# Patient Record
Sex: FEMALE | Race: WHITE | NOT HISPANIC OR LATINO
[De-identification: names, ages, dates, MRNs, and addresses within clinical notes are randomized per-mention and may not be internally consistent; named-entity substitution may affect disease eponyms.]

---

## 2021-05-01 ENCOUNTER — TRANSCRIPTION ENCOUNTER (OUTPATIENT)
Age: 46
End: 2021-05-01

## 2021-05-01 ENCOUNTER — APPOINTMENT (OUTPATIENT)
Dept: DISASTER EMERGENCY | Facility: CLINIC | Age: 46
End: 2021-05-01

## 2021-05-01 DIAGNOSIS — Z01.818 ENCOUNTER FOR OTHER PREPROCEDURAL EXAMINATION: ICD-10-CM

## 2021-05-03 NOTE — ASU PATIENT PROFILE, ADULT - PSH
delivery delivered    Ectopic pregnancy, tubal    History of appendectomy    History of loop recorder      delivery delivered    Ectopic pregnancy, tubal    H/O prior ablation treatment  PVC ablation  History of appendectomy    History of loop recorder

## 2021-05-03 NOTE — ASU PATIENT PROFILE, ADULT - PMH
Lupus    Vitamin B6 deficiency     Gene mutation  MTHFR  Lupus    Miscarriage  x 7 secondary  to gene mutation  Vitamin B6 deficiency

## 2021-05-04 ENCOUNTER — RESULT REVIEW (OUTPATIENT)
Age: 46
End: 2021-05-04

## 2021-05-04 ENCOUNTER — OUTPATIENT (OUTPATIENT)
Dept: INPATIENT UNIT | Facility: HOSPITAL | Age: 46
LOS: 1 days | Discharge: HOME | End: 2021-05-04
Payer: COMMERCIAL

## 2021-05-04 VITALS
HEART RATE: 65 BPM | RESPIRATION RATE: 16 BRPM | HEIGHT: 63 IN | WEIGHT: 175.05 LBS | TEMPERATURE: 99 F | SYSTOLIC BLOOD PRESSURE: 128 MMHG | OXYGEN SATURATION: 98 % | DIASTOLIC BLOOD PRESSURE: 81 MMHG

## 2021-05-04 VITALS
HEART RATE: 68 BPM | SYSTOLIC BLOOD PRESSURE: 114 MMHG | DIASTOLIC BLOOD PRESSURE: 75 MMHG | RESPIRATION RATE: 16 BRPM | OXYGEN SATURATION: 94 %

## 2021-05-04 DIAGNOSIS — Z98.890 OTHER SPECIFIED POSTPROCEDURAL STATES: Chronic | ICD-10-CM

## 2021-05-04 DIAGNOSIS — Z88.0 ALLERGY STATUS TO PENICILLIN: ICD-10-CM

## 2021-05-04 DIAGNOSIS — M32.9 SYSTEMIC LUPUS ERYTHEMATOSUS, UNSPECIFIED: ICD-10-CM

## 2021-05-04 DIAGNOSIS — N60.12 DIFFUSE CYSTIC MASTOPATHY OF LEFT BREAST: ICD-10-CM

## 2021-05-04 DIAGNOSIS — Z87.891 PERSONAL HISTORY OF NICOTINE DEPENDENCE: ICD-10-CM

## 2021-05-04 DIAGNOSIS — N62 HYPERTROPHY OF BREAST: ICD-10-CM

## 2021-05-04 DIAGNOSIS — Z86.718 PERSONAL HISTORY OF OTHER VENOUS THROMBOSIS AND EMBOLISM: ICD-10-CM

## 2021-05-04 DIAGNOSIS — E72.12 METHYLENETETRAHYDROFOLATE REDUCTASE DEFICIENCY: ICD-10-CM

## 2021-05-04 DIAGNOSIS — N60.82 OTHER BENIGN MAMMARY DYSPLASIAS OF LEFT BREAST: ICD-10-CM

## 2021-05-04 DIAGNOSIS — Z88.5 ALLERGY STATUS TO NARCOTIC AGENT: ICD-10-CM

## 2021-05-04 DIAGNOSIS — I10 ESSENTIAL (PRIMARY) HYPERTENSION: ICD-10-CM

## 2021-05-04 DIAGNOSIS — Z90.49 ACQUIRED ABSENCE OF OTHER SPECIFIED PARTS OF DIGESTIVE TRACT: Chronic | ICD-10-CM

## 2021-05-04 DIAGNOSIS — M54.6 PAIN IN THORACIC SPINE: ICD-10-CM

## 2021-05-04 DIAGNOSIS — O00.109 UNSPECIFIED TUBAL PREGNANCY WITHOUT INTRAUTERINE PREGNANCY: Chronic | ICD-10-CM

## 2021-05-04 DIAGNOSIS — D64.9 ANEMIA, UNSPECIFIED: ICD-10-CM

## 2021-05-04 DIAGNOSIS — R01.1 CARDIAC MURMUR, UNSPECIFIED: ICD-10-CM

## 2021-05-04 DIAGNOSIS — N60.31 FIBROSCLEROSIS OF RIGHT BREAST: ICD-10-CM

## 2021-05-04 DIAGNOSIS — N60.32 FIBROSCLEROSIS OF LEFT BREAST: ICD-10-CM

## 2021-05-04 LAB — HCG UR QL: NEGATIVE — SIGNIFICANT CHANGE UP

## 2021-05-04 PROCEDURE — 88305 TISSUE EXAM BY PATHOLOGIST: CPT

## 2021-05-04 PROCEDURE — 88305 TISSUE EXAM BY PATHOLOGIST: CPT | Mod: 26

## 2021-05-04 PROCEDURE — 81025 URINE PREGNANCY TEST: CPT

## 2021-05-04 PROCEDURE — C9399: CPT

## 2021-05-04 RX ORDER — FENTANYL CITRATE 50 UG/ML
50 INJECTION INTRAVENOUS
Refills: 0 | Status: DISCONTINUED | OUTPATIENT
Start: 2021-05-04 | End: 2021-05-04

## 2021-05-04 RX ORDER — METOPROLOL TARTRATE 50 MG
1 TABLET ORAL
Qty: 0 | Refills: 0 | DISCHARGE

## 2021-05-04 RX ORDER — SODIUM CHLORIDE 9 MG/ML
1000 INJECTION, SOLUTION INTRAVENOUS
Refills: 0 | Status: DISCONTINUED | OUTPATIENT
Start: 2021-05-04 | End: 2021-05-04

## 2021-05-04 RX ORDER — ONDANSETRON 8 MG/1
4 TABLET, FILM COATED ORAL ONCE
Refills: 0 | Status: COMPLETED | OUTPATIENT
Start: 2021-05-04 | End: 2021-05-04

## 2021-05-04 RX ORDER — TRAMADOL HYDROCHLORIDE 50 MG/1
25 TABLET ORAL ONCE
Refills: 0 | Status: DISCONTINUED | OUTPATIENT
Start: 2021-05-04 | End: 2021-05-04

## 2021-05-04 RX ORDER — MELOXICAM 15 MG/1
1 TABLET ORAL
Qty: 0 | Refills: 0 | DISCHARGE

## 2021-05-04 RX ADMIN — FENTANYL CITRATE 50 MICROGRAM(S): 50 INJECTION INTRAVENOUS at 15:49

## 2021-05-04 RX ADMIN — FENTANYL CITRATE 50 MICROGRAM(S): 50 INJECTION INTRAVENOUS at 15:32

## 2021-05-04 RX ADMIN — FENTANYL CITRATE 50 MICROGRAM(S): 50 INJECTION INTRAVENOUS at 15:00

## 2021-05-04 RX ADMIN — ONDANSETRON 4 MILLIGRAM(S): 8 TABLET, FILM COATED ORAL at 15:04

## 2021-05-04 RX ADMIN — TRAMADOL HYDROCHLORIDE 25 MILLIGRAM(S): 50 TABLET ORAL at 17:06

## 2021-05-04 NOTE — H&P ADULT - PROBLEM SELECTOR PLAN 1
Surgery today  OOB to chair   pain management   advance diet as tolerated  DC to home when meetin asu criteria

## 2021-05-04 NOTE — H&P ADULT - NSHPPHYSICALEXAM_GEN_ALL_CORE
Neuro: A+O x4 AMEZCUA  RESP: CTA A/P  CV: RRR S1 S2  Breast: Hypertrophic ptotic  ABD: Soft non tender +BS

## 2021-05-04 NOTE — ASU DISCHARGE PLAN (ADULT/PEDIATRIC) - ASU DC SPECIAL INSTRUCTIONSFT
You should follow-up in the office on 5/7/21, please call for follow-up appointment if you have not already made one. [phone]. Please keep an accurate record of your drain output and bring it to your follow up appointment.  Please start Aspirin 81mg tonight for the next 7 days.   Prescription pain medications have been prescribed for you, take as needed for pain only. Do not drive a vehicle or operate machinery while taking narcotic pain medication.   Ambulating is very important post operatively, make sure you ambulate several times daily.   If you experience bleeding that does not stop, swelling, hardness of surgical site, chest pain, shortness of breath, leg pain, dizziness or any signs or symptoms of infection such as fever, redness, pus, foul smell of surgical site please contact the office immediately, [phone].

## 2021-05-04 NOTE — ASU DISCHARGE PLAN (ADULT/PEDIATRIC) - CARE PROVIDER_API CALL
Garland Antonio)  Plastic Surgery  [address], [address]  [place] [address]  Phone: [phone]  Fax: [phone]  Follow Up Time:

## 2021-05-04 NOTE — H&P ADULT - NSICDXPASTSURGICALHX_GEN_ALL_CORE_FT
PAST SURGICAL HISTORY:   delivery delivered     Ectopic pregnancy, tubal     H/O prior ablation treatment PVC ablation    History of appendectomy     History of loop recorder

## 2021-05-04 NOTE — H&P ADULT - NSICDXPASTMEDICALHX_GEN_ALL_CORE_FT
PAST MEDICAL HISTORY:  Gene mutation MTHFR    Lupus     Macromastia     Miscarriage x 7 secondary  to gene mutation    Vitamin B6 deficiency

## 2021-05-04 NOTE — H&P ADULT - HISTORY OF PRESENT ILLNESS
Pt comes to the office with c/o neck shoulder back pain found to have bilateral breast hypertrophy on exam

## 2022-06-17 NOTE — ASU DISCHARGE PLAN (ADULT/PEDIATRIC) - MODE OF TRANSPORTATION
"Requested Prescriptions   Pending Prescriptions Disp Refills    amLODIPine (NORVASC) 10 MG tablet [Pharmacy Med Name: AMLODIPINE BESYLATE 10MG TABS] 90 tablet 3     Sig: TAKE ONE TABLET BY MOUTH ONCE DAILY        Calcium Channel Blockers Protocol  Failed - 6/15/2022  8:19 AM        Failed - Blood pressure under 140/90 in past 12 months       BP Readings from Last 3 Encounters:   05/17/22 (!) 147/94   04/21/22 (!) 167/112   04/16/22 100/72                 Passed - Recent (12 mo) or future (30 days) visit within the authorizing provider's specialty     Patient has had an office visit with the authorizing provider or a provider within the authorizing providers department within the previous 12 mos or has a future within next 30 days. See \"Patient Info\" tab in inbasket, or \"Choose Columns\" in Meds & Orders section of the refill encounter.              Passed - Medication is active on med list        Passed - Patient is age 18 or older        Passed - No active pregnancy on record        Passed - Normal serum creatinine on file in past 12 months     Recent Labs   Lab Test 05/24/22  1136 03/30/17  1059 11/21/16  0910   CR 0.99   < >  --    CREAT  --   --  1.5*    < > = values in this interval not displayed.       Ok to refill medication if creatinine is low          Passed - No positive pregnancy test in past 12 months              "
Wheelchair/Stroller

## 2023-06-05 NOTE — ASU DISCHARGE PLAN (ADULT/PEDIATRIC) - NPI NUMBER (FOR SYSADMIN USE ONLY) :
Pt was at home and opened the door when a cat scratch right foot. Pt is unsure is the animal is a house cat or stray cat.
[[id number]]

## 2023-09-11 ENCOUNTER — NON-APPOINTMENT (OUTPATIENT)
Age: 48
End: 2023-09-11

## 2023-09-11 ENCOUNTER — APPOINTMENT (OUTPATIENT)
Dept: OPHTHALMOLOGY | Facility: CLINIC | Age: 48
End: 2023-09-11
Payer: COMMERCIAL

## 2023-09-11 PROBLEM — N62 HYPERTROPHY OF BREAST: Chronic | Status: ACTIVE | Noted: 2021-05-04

## 2023-09-11 PROBLEM — O03.9 COMPLETE OR UNSPECIFIED SPONTANEOUS ABORTION WITHOUT COMPLICATION: Chronic | Status: ACTIVE | Noted: 2021-05-03

## 2023-09-11 PROBLEM — Z15.89 GENETIC SUSCEPTIBILITY TO OTHER DISEASE: Chronic | Status: ACTIVE | Noted: 2021-05-03

## 2023-09-11 PROBLEM — M32.9 SYSTEMIC LUPUS ERYTHEMATOSUS, UNSPECIFIED: Chronic | Status: ACTIVE | Noted: 2021-05-03

## 2023-09-11 PROBLEM — E53.1 PYRIDOXINE DEFICIENCY: Chronic | Status: ACTIVE | Noted: 2021-05-03

## 2023-09-11 PROCEDURE — 92014 COMPRE OPH EXAM EST PT 1/>: CPT

## 2024-03-11 ENCOUNTER — APPOINTMENT (OUTPATIENT)
Dept: OPHTHALMOLOGY | Facility: CLINIC | Age: 49
End: 2024-03-11